# Patient Record
(demographics unavailable — no encounter records)

---

## 2025-05-20 NOTE — HISTORY OF PRESENT ILLNESS
[FreeTextEntry1] : Red Levin is a 55-year-old gentleman with a history of CAD, S/P PTCI and NADER x1 to the LAD on 1/7/2025, diabetes mellitus, hypertension, former smoker, who presents today for cardiovascular consultation. The patient is looking to establish care with a new cardiologist. In January, the patient was feeling tired, lightheaded and dizzy, and was having numbness in his arms. He then had shooting pain in his arm, which prompted [facility] presentation. He had a catheterization with stent placement. Later that night when he was home, he had chest pain, and returned to the ER. Workup was unremarkable, and it was felt that his symptoms were acid reflux, and he was sent home. The patient notes that since then, he remains fatigued, and is feeling short of breath. He is concerned that he may still have an obstruction. He denies recent chest pain, palpitations, syncope, orthopnea, or PND.

## 2025-05-20 NOTE — DISCUSSION/SUMMARY
[EKG obtained to assist in diagnosis and management of assessed problem(s)] : EKG obtained to assist in diagnosis and management of assessed problem(s) [___ Month(s)] : in [unfilled] month(s) [FreeTextEntry1] : The patient's heart rate and blood pressure are well controlled. I have asked him to continue with his current medications as prescribed without change.  I have asked the patient to undergo an exercise Cardiolite stress test to evaluate for myocardial ischemia.  I have asked the patient to undergo an echocardiogram to assess LV size, wall thickness, systolic function, valvular function, and pulmonary artery systolic pressure.   I have asked the patient to follow a low salt, low fat, low cholesterol diet. I have asked the patient not to engage in any new exercises or activities until after the cardiac work up is complete.  I have asked that the patient follow up with me in one months' time, or sooner with any change in symptoms.  I, Rebecca Chavez, am scribing for and the presence of Dr. Jerel Reina, the following sections HISTORY OF PRESENT ILLNESSS; CARDIOLOGY SUMMARY; ACTIVE PROBLEMS; PAST MEDICAL HISTORY; PAST SURGICAL HISTORY; FAMILY HISTORY; SOCIAL HISTORY; REVIEW OF SYSTEMS; PHYSICAL EXAM; ASSESSMENT; PLAN.

## 2025-05-20 NOTE — PAST MEDICAL HISTORY
[TextEntry] : -  CAD, S/P stent placement.  -  Diabetes mellitus.  -  Tinnitus.  -  Cataracts. -  Hearing loss, wears hearing aids.  -  Former smoker.  -  Osteoarthritis.  -  Depression.

## 2025-05-20 NOTE — CARDIOLOGY SUMMARY
[de-identified] : From 5/20/2025: Normal sinus rhythm with a rate of 89 bpm, left axis, normal FL interval 144 ms, normal QRS duration 94 ms, normal QT interval 421 ms, with early R wave transition, isolated Q waves in aVF.

## 2025-05-20 NOTE — SURGICAL HISTORY
[TextEntry] : -  Appendectomy.  -  Tooth extraction.  -  Cataract surgery. -  Cardiac cath and stent placement.

## 2025-05-20 NOTE — REVIEW OF SYSTEMS
[Feeling Fatigued] : feeling fatigued [SOB] : shortness of breath [Dyspnea on exertion] : dyspnea during exertion [Fever] : no fever [Headache] : no headache [Weight Gain (___ Lbs)] : no recent weight gain [Chills] : no chills [Weight Loss (___ Lbs)] : no recent weight loss [Blurry Vision] : no blurred vision [Seeing Double (Diplopia)] : no diplopia [Eye Pain] : no eye pain [Earache] : no earache [Discharge From Ears] : no discharge from the ears [Hearing Loss] : no hearing loss [Mouth Sores] : no mouth sores [Sore Throat] : no sore throat [Sinus Pressure] : no sinus pressure [Tinnitus] : no tinnitus [Vertigo] : no vertigo [Chest Discomfort] : no chest discomfort [Lower Ext Edema] : no extremity edema [Leg Claudication] : no intermittent leg claudication [Palpitations] : no palpitations [Orthopnea] : no orthopnea [PND] : no PND [Syncope] : no syncope [Cough] : no cough [Wheezing] : no wheezing [Coughing Up Blood] : no hemoptysis [Snoring] : no snoring [Abdominal Pain] : no abdominal pain [Nausea] : no nausea [Vomiting] : no vomiting [Heartburn] : no heartburn [Change in Appetite] : no change in appetite [Change In The Stool] : no change in stool [Dysphagia] : no dysphagia [Diarrhea] : diarrhea [Constipation] : no constipation [Blood in stool] : no blood in stoo [Urinary Frequency] : no change in urinary frequency [Hematuria] : no hematuria [Pain During Urination] : no dysuria [Erectile Dysfunction] : no erectile dysfunction [Nocturia] : no nocturia [Joint Pain] : no joint pain [Joint Swelling] : no joint swelling [Joint Stiffness] : no joint stiffness [Muscle Cramps] : no muscle cramps [Myalgia] : no myalgia [Rash] : no rash [Itching] : no itching [Change In Color Of Skin] : change in skin color [Skin Lesions] : no skin lesions [Telangiectasias] : no telangiectasias [Dizziness] : no dizziness [Tremor] : no tremor was seen [Numbness (Hypoesthesia)] : no numbness [Convulsions] : no convulsions [Tingling (Paresthesia)] : no tingling [Weakness] : no weakness [Limb Weakness (Paresis)] : no limb weakness (Paresis) [Speech Disturbance] : no speech disturbance [Confusion] : no confusion was observed [Memory Lapses Or Loss] : no memory lapses or loss [Depression] : no depression [Anxiety] : no anxiety [Under Stress] : not under stress [Suicidal] : not suicidal [Easy Bleeding] : no tendency for easy bleeding [Swollen Glands] : no swollen glands [Easy Bruising] : no tendency for easy bruising

## 2025-05-20 NOTE — ASSESSMENT
[FreeTextEntry1] : 1.  CAD, S/P PTCI and NADER to LAD on 1/7/2025.  2.  Hypertension.  3.  Type II diabetes mellitus.  4.  Former smoker.  5.  Fatigue.  6.  Shortness of breath.  7.  Family history of CAD.

## 2025-05-20 NOTE — FAMILY HISTORY
[TextEntry] : The patient's mother passed away age 82 from esophageal cancer.  The patient's father is 93. He has CAD with CABGx3, and CHF.

## 2025-05-20 NOTE — SOCIAL HISTORY
[TextEntry] : The patient is . He has one daughter. He quit smoking cigarettes in December 2024. Prior to this he smoked about 1 pack daily from the age of 14. He has 1-2 alcoholic beverages monthly. He denies any drug use or vaping. 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

## 2025-06-18 NOTE — DATA REVIEWED
[FreeTextEntry1] : CT [facility] 3/2023:  Very small left lower lobe nodule (2mm) Lung-RADS 2: Recommend screening low dose CT in 12 months.

## 2025-06-18 NOTE — HISTORY OF PRESENT ILLNESS
[FreeTextEntry1] : NP [de-identified] : Pt is 54yo male with PMH of T2DM, CAD x 1 stent (01/2025), HLD, acid reflux, depression, OA, obesity, who presents to establish care and some concerns.  Pt was diagnosed with DM more than 12 years ago. He has been on oral meds and for the past year on Trulicity as well.  For CAD pt has been compliant with meds. No episodes of chest pain after stent. Occasionally mild tightness and discomfort but was found to have acid reflux as well and has upcoming appointments for workup.   Quit smoking about 6 months ago, has had 7 cig since. He has had one CT low dose screen and was found with nodule. Report found in [facility]IE from 3/2023.  Gained weight after quitting smoking but did not tolerate higher dose of Trulicity last year.  Pt wants to establish care with pain management. He has OA with frequent episodes of muscle and joint pain and lumbar disc disease. He has done therapy before and lumbar ablation as well.  For depression he has been on therapy for about 6 years and low dose meds with some improvement but room to work on and feel better. He would like to see psych again. He started bupropion to help with smoking cessation and depression but has been in very low dose per his request.  Pt request checkup for benzene levels due to abnormal levels years ago from work.  Last cardio heather was last month.  Last eye exam 2023 Podiatrist never.  Colonoscopy never.

## 2025-06-18 NOTE — PHYSICAL EXAM
[No Acute Distress] : no acute distress [Well-Appearing] : well-appearing [Normal Sclera/Conjunctiva] : normal sclera/conjunctiva [PERRL] : pupils equal round and reactive to light [EOMI] : extraocular movements intact [Normal Outer Ear/Nose] : the outer ears and nose were normal in appearance [Normal Oropharynx] : the oropharynx was normal [Normal TMs] : both tympanic membranes were normal [No Lymphadenopathy] : no lymphadenopathy [Supple] : supple [Thyroid Normal, No Nodules] : the thyroid was normal and there were no nodules present [No Respiratory Distress] : no respiratory distress  [Clear to Auscultation] : lungs were clear to auscultation bilaterally [Normal Rate] : normal rate  [Regular Rhythm] : with a regular rhythm [Normal S1, S2] : normal S1 and S2 [No Murmur] : no murmur heard [No Edema] : there was no peripheral edema [Soft] : abdomen soft [Non Tender] : non-tender [Non-distended] : non-distended [Grossly Normal Strength/Tone] : grossly normal strength/tone [No Focal Deficits] : no focal deficits [Normal Gait] : normal gait [Normal Affect] : the affect was normal [Normal Mood] : the mood was normal [Normal Insight/Judgement] : insight and judgment were intact

## 2025-06-18 NOTE — HEALTH RISK ASSESSMENT
[de-identified] : Whisky  [Audit-CScore] : 4 [de-identified] : not exercising.  [de-identified] : irregular with diet.

## 2025-06-18 NOTE — HEALTH RISK ASSESSMENT
[de-identified] : Whisky  [Audit-CScore] : 4 [de-identified] : not exercising.  [de-identified] : irregular with diet.

## 2025-06-18 NOTE — HEALTH RISK ASSESSMENT
[de-identified] : Whisky  [Audit-CScore] : 4 [de-identified] : not exercising.  [de-identified] : irregular with diet.

## 2025-06-18 NOTE — ASSESSMENT
[FreeTextEntry1] : **Total time spent caring for this patient today was 65 minutes. This includes time spent before the visit reviewing the chart, time during the visit performing a medically appropriate evaluation and examination, counseling and educating the patient, ordering medications, tests, referring to other health care professionals about management, and documenting clinical information in the electronic medical record.

## 2025-06-18 NOTE — HISTORY OF PRESENT ILLNESS
[FreeTextEntry1] : NP [de-identified] : Pt is 54yo male with PMH of T2DM, CAD x 1 stent (01/2025), HLD, acid reflux, depression, OA, obesity, who presents to establish care and some concerns.  Pt was diagnosed with DM more than 12 years ago. He has been on oral meds and for the past year on Trulicity as well.  For CAD pt has been compliant with meds. No episodes of chest pain after stent. Occasionally mild tightness and discomfort but was found to have acid reflux as well and has upcoming appointments for workup.   Quit smoking about 6 months ago, has had 7 cig since. He has had one CT low dose screen and was found with nodule. Report found in [facility]IE from 3/2023.  Gained weight after quitting smoking but did not tolerate higher dose of Trulicity last year.  Pt wants to establish care with pain management. He has OA with frequent episodes of muscle and joint pain and lumbar disc disease. He has done therapy before and lumbar ablation as well.  For depression he has been on therapy for about 6 years and low dose meds with some improvement but room to work on and feel better. He would like to see psych again. He started bupropion to help with smoking cessation and depression but has been in very low dose per his request.  Pt request checkup for benzene levels due to abnormal levels years ago from work.  Last cardio heather was last month.  Last eye exam 2023 Podiatrist never.  Colonoscopy never.

## 2025-06-18 NOTE — HISTORY OF PRESENT ILLNESS
[FreeTextEntry1] : NP [de-identified] : Pt is 54yo male with PMH of T2DM, CAD x 1 stent (01/2025), HLD, acid reflux, depression, OA, obesity, who presents to establish care and some concerns.  Pt was diagnosed with DM more than 12 years ago. He has been on oral meds and for the past year on Trulicity as well.  For CAD pt has been compliant with meds. No episodes of chest pain after stent. Occasionally mild tightness and discomfort but was found to have acid reflux as well and has upcoming appointments for workup.   Quit smoking about 6 months ago, has had 7 cig since. He has had one CT low dose screen and was found with nodule. Report found in [facility]IE from 3/2023.  Gained weight after quitting smoking but did not tolerate higher dose of Trulicity last year.  Pt wants to establish care with pain management. He has OA with frequent episodes of muscle and joint pain and lumbar disc disease. He has done therapy before and lumbar ablation as well.  For depression he has been on therapy for about 6 years and low dose meds with some improvement but room to work on and feel better. He would like to see psych again. He started bupropion to help with smoking cessation and depression but has been in very low dose per his request.  Pt request checkup for benzene levels due to abnormal levels years ago from work.  Last cardio heather was last month.  Last eye exam 2023 Podiatrist never.  Colonoscopy never.

## 2025-06-23 NOTE — HISTORY OF PRESENT ILLNESS
[de-identified] : 06/23/2025  ABHILASH LUA 55 year y/o M presents today for right hip pain.  Patient notes pain lateral hip and into the groin.  He notes pain with activity.  He has long hx of low back issues stenosis, sciatica.  He has hx of ablations and injections.   Date of Onset: 1 month ago  Mechanism of injury: NKI     Pain:    At Rest: 8/10     With Activity: 10/10     Have you been treated for this in the past? No  OTC/Rx Medication: Ibuprofen PRN with some relief.  Heat, Ice, Elevation: No  Previous Imaging/Studies: No

## 2025-06-23 NOTE — PHYSICAL EXAM
[Facet arthropathy] : Facet arthropathy [Disc space narrowing] : Disc space narrowing [Spondylolithesis] : Spondylolithesis [Right] : right hip with pelvis [de-identified] : Constitutional: The patient appears well developed, well nourished. Examination of patients ability to communicate functionally was normal.       Neurologic: Coordination is normal. Alert and oriented to time, place and person. No evidence of mood disorder, calm affect.       RIGHT      HIP/THIGH: Inspection of the hip/thigh is as follows: Inspection shows no swelling, no ecchymosis, no erythema, no rashes, no masses and no enlarged lymph nodes.       Palpation of the hip/thigh is as follows: greater trochanter tenderness. no groin tenderness.       Range of motion of the hip is as follows in degrees:        Flexion: 110    Abduction:  30    External rotation:  40    Internal rotation:  30        Strength testing of the hip/thigh is as follows:       Hip flexion strength:  5/5,    Hip extension strength:  5/5    Hip abduction strength:   5/5     Hip adduction strength:   5/5.       Special tests of the hip/thigh is as follows: pain in bursa with internal rotation and pain bursa with external rotation.       Neurological testing of the hip/thigh is as follows: motor exam 5/5 throughout, light touch intact throughout and no focal motor defecits.       Gait and function is as follows: non-antalgic gait.   Pat reflex Right absent Left 1+ Ankle reflex 2 b/l    Constitutional:  The patient appears well developed, well nourished.  Examination of patients ability to communicate functionally was normal.    [FreeTextEntry1] : ap/lat show L5-S1 grade 1 spondylolisthesis [FreeTextEntry9] : ap/lat show minimal inferior joint spurring.  . no frx noted

## 2025-06-23 NOTE — DISCUSSION/SUMMARY
[de-identified] :  ABHILASH LUA 55 year M was seen and evaluated in office today. Following evaluation, the natural history of the pathology was explained in full to the patient in layman's terms.   Several different treatment options were discussed and explained in full to the patient, along with specific risks and benefits of both surgical and non-surgical treatments. Nonsurgical options including but not limited to Corticosteroid Injection, Prescription anti-inflammatory medications (both steroidal and non-steroidal), activity modification, non-impact exercise, maintaining a healthy BMI, bracing, and icing were all reviewed.    Patient was given a CSI to the RIGHT TROCHANTERIC BURSA Patient was advised to ice if sore and will observe over the next 24 hours for any redness, swelling or increased discomfort. The indication for this procedure was pain and inflammation along with tenderness on exam with limited function secondary to these symptoms. The site was prepped with betadine and sterile technique used. An injection of Lidocaine 5cc of 1% was used Betamethasone (Celestone) 1cc of 6mg, using a 22 gauge needle.  The patient tolerated procedure well. They were advised to call if redness, pain or fever occur and apply ice for 15 minutes out of every hour for the next 12-24 hours as tolerated. The risks benefits, and alternatives have been discussed. These risks include infection, hemarthrosis, allergic reaction, bleeding and hyperglycemia. Verbal understanding and consent was obtained. There is a moderate risk of morbidity with further treatment, especially from use of prescription strength medication.  At this time, patient has significant multilevel degenerative changes in the Lumbar spine, along with complaints consistent with lumbar radiculopathy. Patient was advised on the diagnosis at length today and further discussed with patient that they should be evaluated by an orthopedic spine specialist. Patient was sent for Lumbar Spine MRI w/o contrast at this time to evaluate for HNP/Stenosis causing resultant complaints of radicular pain down lower extremities. Patient was advised to follow up as needed pending orthopedic spine specialist evaluation. Patient expressed understanding of this at this time.  The patient f/u in 1 month.    Entered by Jeffrey Pedraza acting as scribe. Dr. Herrera Attestation The documentation recorded by the scribe, in my presence, accurately reflects the service I, Dr. Herrera, personally performed, and the decisions made by me with my edits as appropriate.

## 2025-07-16 NOTE — DISCUSSION/SUMMARY
[de-identified] :  ABHILASH LUA 55 year M was seen and evaluated in office today. Following evaluation, the natural history of the pathology was explained in full to the patient in layman's terms. Several different treatment options were discussed and explained in full to the patient, along with specific risks and benefits of both surgical and non-surgical treatments. Nonsurgical options including but not limited to Prescription anti-inflammatory medications (both steroidal and non-steroidal), activity modification, non-impact exercises, and icing were all reviewed.   Discussed risk of morbidity associated with proposed treatment plans. These risks include, but are not limited to, the risk associated with prescription strength medications and possible side effects of these medications which include GI hemorrhage with oral medications along with cardiac/renal issues with long term use.    Furthermore, discussed with ABHILASH that they could also delay any immediate treatment options and continue to observe and self-care for the discussed problem. Discussed Home Exercise Programs as well as Rest, Ice and elevation. Patient had ample time to ask any questions about todays visit and the diagnosis, and all questions were answered. Patient understands the plan going forward.   PLAN: Patient reports that overall his lateral right hip pain has improved following CSI.  At this time, patient has significant lumbar pathology that is likely contributing to right hip pain and right lower extremity pain. Patient was advised to consult orthopedic spine specialist regarding further evaluation of the Lumbar Spine. Provided patient with Dr. Torres information, encouraged consult.  Will continue to observe Right Hip pain at this time, advised patient to continue use of Ibuprofen OTC as needed for pain and ice.   F/u prn

## 2025-07-16 NOTE — DATA REVIEWED
[MRI] : MRI [Lumbar Spine] : lumbar spine [I independently reviewed and interpreted images and report] : I independently reviewed and interpreted images and report [FreeTextEntry1] : MRI AT [facility] ON 7/2/2025 MRI of the lumbar spine demonstrates levocurvature lumbar spine with multilevel spondylosis and facet arthropathy. Congenital lumbar spinal canal stenosis is present. At L3-L4 there is severe spinal canal stenosis and moderate to severe right greater than left neural foraminal stenosis. At L4-L5 there is moderate left greater than right lateral recess narrowing At L5-S1 there is a 1 anterolisthesis with chronic bilateral pars defects and moderate to severe bilateral facet arthropathy. Moderate spinal canal stenosis and moderate to sever right and severe left neural foraminal stenosis.

## 2025-07-16 NOTE — DATA REVIEWED
[MRI] : MRI [Lumbar Spine] : lumbar spine [I independently reviewed and interpreted images and report] : I independently reviewed and interpreted images and report [FreeTextEntry1] : MRI AT [facility] ON 7/2/2025 MRI of the lumbar spine demonstrates levocurvature lumbar spine with multilevel spondylosis and facet arthropathy. Congenital lumbar spinal canal stenosis is present. At L3-L4 there is severe spinal canal stenosis and moderate to severe right greater than left neural foraminal stenosis. At L4-L5 there is moderate left greater than right lateral recess narrowing At L5-S1 there is a 1 anterolisthesis with chronic bilateral pars defects and moderate to severe bilateral facet arthropathy. Moderate spinal canal stenosis and moderate to sever right and severe left neural foraminal stenosis.

## 2025-07-16 NOTE — HISTORY OF PRESENT ILLNESS
[de-identified] : 07/14/2025  ABHILASH LUA 55 year y/o M presents today for follow up right hip pain  Treatments since last visit: He was given CSI which he states gave him good relief.  Changes Since Last Visit: MRI at [facility] on 7/2/2025  IMPRESSION: MRI of the lumbar spine demonstrates levocurvature lumbar spine with multilevel spondylosis and facet arthropathy. Congenital lumbar spinal canal stenosis is present. At L3-L4 there is severe spinal canal stenosis and moderate to severe right greater than left neural foraminal stenosis. At L4-L5 there is moderate left greater than right lateral recess narrowing At L5-S1 there is a 1 anterolisthesis with chronic bilateral pars defects and moderate to severe bilateral facet arthropathy. Moderate spinal canal stenosis and moderate to sever right and severe left neural foraminal stenosis.   06/23/2025  ABHILASH LUA 55 year y/o M presents today for right hip pain.  Patient notes pain lateral hip and into the groin.  He notes pain with activity.  He has long hx of low back issues stenosis, sciatica.  He has hx of ablations and injections.   Date of Onset: 1 month ago  Mechanism of injury: NKI     Pain:    At Rest: 8/10     With Activity: 10/10     Have you been treated for this in the past? No  OTC/Rx Medication: Ibuprofen PRN with some relief.  Heat, Ice, Elevation: No  Previous Imaging/Studies: No

## 2025-07-16 NOTE — PHYSICAL EXAM
[de-identified] : Constitutional: The patient appears well developed, well nourished. Examination of patients ability to communicate functionally was normal.       Neurologic: Coordination is normal. Alert and oriented to time, place and person. No evidence of mood disorder, calm affect.       RIGHT      HIP/THIGH: Inspection of the hip/thigh is as follows: Inspection shows no swelling, no ecchymosis, no erythema, no rashes, no masses and no enlarged lymph nodes.       Palpation of the hip/thigh is as follows: greater trochanter tenderness. no groin tenderness.       Range of motion of the hip is as follows in degrees:        Flexion: 110    Abduction:  30    External rotation:  40    Internal rotation:  30        Strength testing of the hip/thigh is as follows:       Hip flexion strength:  5/5,    Hip extension strength:  5/5    Hip abduction strength:   5/5     Hip adduction strength:   5/5.       Special tests of the hip/thigh is as follows: pain in bursa with internal rotation and pain bursa with external rotation.       Neurological testing of the hip/thigh is as follows: motor exam 5/5 throughout, light touch intact throughout and no focal motor defecits.       Gait and function is as follows: non-antalgic gait.   Pat reflex Right absent Left 1+ Ankle reflex 2 b/l    Constitutional:  The patient appears well developed, well nourished.  Examination of patients ability to communicate functionally was normal.

## 2025-07-16 NOTE — HISTORY OF PRESENT ILLNESS
[de-identified] : 07/14/2025  ABHILASH LUA 55 year y/o M presents today for follow up right hip pain  Treatments since last visit: He was given CSI which he states gave him good relief.  Changes Since Last Visit: MRI at [facility] on 7/2/2025  IMPRESSION: MRI of the lumbar spine demonstrates levocurvature lumbar spine with multilevel spondylosis and facet arthropathy. Congenital lumbar spinal canal stenosis is present. At L3-L4 there is severe spinal canal stenosis and moderate to severe right greater than left neural foraminal stenosis. At L4-L5 there is moderate left greater than right lateral recess narrowing At L5-S1 there is a 1 anterolisthesis with chronic bilateral pars defects and moderate to severe bilateral facet arthropathy. Moderate spinal canal stenosis and moderate to sever right and severe left neural foraminal stenosis.   06/23/2025  ABHILASH LUA 55 year y/o M presents today for right hip pain.  Patient notes pain lateral hip and into the groin.  He notes pain with activity.  He has long hx of low back issues stenosis, sciatica.  He has hx of ablations and injections.   Date of Onset: 1 month ago  Mechanism of injury: NKI     Pain:    At Rest: 8/10     With Activity: 10/10     Have you been treated for this in the past? No  OTC/Rx Medication: Ibuprofen PRN with some relief.  Heat, Ice, Elevation: No  Previous Imaging/Studies: No

## 2025-07-16 NOTE — PHYSICAL EXAM
[de-identified] : Constitutional: The patient appears well developed, well nourished. Examination of patients ability to communicate functionally was normal.       Neurologic: Coordination is normal. Alert and oriented to time, place and person. No evidence of mood disorder, calm affect.       RIGHT      HIP/THIGH: Inspection of the hip/thigh is as follows: Inspection shows no swelling, no ecchymosis, no erythema, no rashes, no masses and no enlarged lymph nodes.       Palpation of the hip/thigh is as follows: greater trochanter tenderness. no groin tenderness.       Range of motion of the hip is as follows in degrees:        Flexion: 110    Abduction:  30    External rotation:  40    Internal rotation:  30        Strength testing of the hip/thigh is as follows:       Hip flexion strength:  5/5,    Hip extension strength:  5/5    Hip abduction strength:   5/5     Hip adduction strength:   5/5.       Special tests of the hip/thigh is as follows: pain in bursa with internal rotation and pain bursa with external rotation.       Neurological testing of the hip/thigh is as follows: motor exam 5/5 throughout, light touch intact throughout and no focal motor defecits.       Gait and function is as follows: non-antalgic gait.   Pat reflex Right absent Left 1+ Ankle reflex 2 b/l    Constitutional:  The patient appears well developed, well nourished.  Examination of patients ability to communicate functionally was normal.

## 2025-07-16 NOTE — DISCUSSION/SUMMARY
[de-identified] :  ABHILASH LUA 55 year M was seen and evaluated in office today. Following evaluation, the natural history of the pathology was explained in full to the patient in layman's terms. Several different treatment options were discussed and explained in full to the patient, along with specific risks and benefits of both surgical and non-surgical treatments. Nonsurgical options including but not limited to Prescription anti-inflammatory medications (both steroidal and non-steroidal), activity modification, non-impact exercises, and icing were all reviewed.   Discussed risk of morbidity associated with proposed treatment plans. These risks include, but are not limited to, the risk associated with prescription strength medications and possible side effects of these medications which include GI hemorrhage with oral medications along with cardiac/renal issues with long term use.    Furthermore, discussed with ABHILASH that they could also delay any immediate treatment options and continue to observe and self-care for the discussed problem. Discussed Home Exercise Programs as well as Rest, Ice and elevation. Patient had ample time to ask any questions about todays visit and the diagnosis, and all questions were answered. Patient understands the plan going forward.   PLAN: Patient reports that overall his lateral right hip pain has improved following CSI.  At this time, patient has significant lumbar pathology that is likely contributing to right hip pain and right lower extremity pain. Patient was advised to consult orthopedic spine specialist regarding further evaluation of the Lumbar Spine. Provided patient with Dr. Torres information, encouraged consult.  Will continue to observe Right Hip pain at this time, advised patient to continue use of Ibuprofen OTC as needed for pain and ice.   F/u prn